# Patient Record
Sex: MALE | Race: BLACK OR AFRICAN AMERICAN | Employment: OTHER | URBAN - METROPOLITAN AREA
[De-identification: names, ages, dates, MRNs, and addresses within clinical notes are randomized per-mention and may not be internally consistent; named-entity substitution may affect disease eponyms.]

---

## 2018-10-31 ENCOUNTER — ED HISTORICAL/CONVERTED ENCOUNTER (OUTPATIENT)
Dept: OTHER | Age: 68
End: 2018-10-31

## 2019-02-23 ENCOUNTER — IP HISTORICAL/CONVERTED ENCOUNTER (OUTPATIENT)
Dept: OTHER | Age: 69
End: 2019-02-23

## 2019-09-05 ENCOUNTER — IP HISTORICAL/CONVERTED ENCOUNTER (OUTPATIENT)
Dept: OTHER | Age: 69
End: 2019-09-05

## 2019-10-17 ENCOUNTER — ED HISTORICAL/CONVERTED ENCOUNTER (OUTPATIENT)
Dept: OTHER | Age: 69
End: 2019-10-17

## 2019-10-23 ENCOUNTER — IP HISTORICAL/CONVERTED ENCOUNTER (OUTPATIENT)
Dept: OTHER | Age: 69
End: 2019-10-23

## 2019-11-08 ENCOUNTER — ED HISTORICAL/CONVERTED ENCOUNTER (OUTPATIENT)
Dept: OTHER | Age: 69
End: 2019-11-08

## 2019-11-20 ENCOUNTER — IP HISTORICAL/CONVERTED ENCOUNTER (OUTPATIENT)
Dept: OTHER | Age: 69
End: 2019-11-20

## 2020-09-23 ENCOUNTER — APPOINTMENT (OUTPATIENT)
Dept: ULTRASOUND IMAGING | Age: 70
End: 2020-09-23
Attending: HOSPITALIST
Payer: MEDICARE

## 2020-09-23 ENCOUNTER — HOSPITAL ENCOUNTER (OUTPATIENT)
Age: 70
Setting detail: OBSERVATION
Discharge: HOME | End: 2020-09-24
Attending: HOSPITALIST | Admitting: HOSPITALIST
Payer: MEDICARE

## 2020-09-23 ENCOUNTER — APPOINTMENT (OUTPATIENT)
Dept: GENERAL RADIOLOGY | Age: 70
End: 2020-09-23
Attending: EMERGENCY MEDICINE
Payer: MEDICARE

## 2020-09-23 ENCOUNTER — APPOINTMENT (OUTPATIENT)
Dept: GENERAL RADIOLOGY | Age: 70
End: 2020-09-23
Attending: RADIOLOGY
Payer: MEDICARE

## 2020-09-23 ENCOUNTER — HOSPITAL ENCOUNTER (EMERGENCY)
Age: 70
Discharge: OTHER INSTITUTION | End: 2020-09-23
Attending: EMERGENCY MEDICINE
Payer: MEDICARE

## 2020-09-23 VITALS
DIASTOLIC BLOOD PRESSURE: 66 MMHG | OXYGEN SATURATION: 99 % | HEIGHT: 68 IN | HEART RATE: 87 BPM | TEMPERATURE: 98.6 F | RESPIRATION RATE: 21 BRPM | SYSTOLIC BLOOD PRESSURE: 145 MMHG

## 2020-09-23 DIAGNOSIS — J90 PLEURAL EFFUSION: Primary | ICD-10-CM

## 2020-09-23 PROBLEM — F11.11 OPIOID ABUSE, IN REMISSION (HCC): Status: ACTIVE | Noted: 2020-09-23

## 2020-09-23 PROBLEM — K70.31 ASCITES DUE TO ALCOHOLIC CIRRHOSIS (HCC): Status: ACTIVE | Noted: 2020-01-02

## 2020-09-23 PROBLEM — I85.10 SECONDARY ESOPHAGEAL VARICES WITHOUT BLEEDING (HCC): Status: ACTIVE | Noted: 2020-09-23

## 2020-09-23 PROBLEM — N18.30 CKD (CHRONIC KIDNEY DISEASE), STAGE III (HCC): Status: ACTIVE | Noted: 2019-12-28

## 2020-09-23 LAB
ALBUMIN SERPL-MCNC: 1.5 G/DL (ref 3.5–4.7)
ALBUMIN/GLOB SERPL: 0.3 {RATIO}
ALP SERPL-CCNC: 168 U/L (ref 38–126)
ALT SERPL-CCNC: 33 U/L (ref 3–72)
ANION GAP SERPL CALC-SCNC: 6 MMOL/L
AST SERPL W P-5'-P-CCNC: 84 U/L (ref 17–74)
ATRIAL RATE: 132 BPM
BASOPHILS # BLD: 0 K/UL (ref 0–0.1)
BASOPHILS NFR BLD: 0 % (ref 0–2)
BILIRUB SERPL-MCNC: 3.5 MG/DL (ref 0.2–1)
BUN SERPL-MCNC: 40 MG/DL (ref 9–21)
BUN/CREAT SERPL: 18
CA-I BLD-MCNC: 7.4 MG/DL (ref 8.5–10.5)
CALCULATED P AXIS, ECG09: -24 DEGREES
CALCULATED R AXIS, ECG10: 12 DEGREES
CALCULATED T AXIS, ECG11: 45 DEGREES
CHLORIDE SERPL-SCNC: 105 MMOL/L (ref 94–111)
CO2 SERPL-SCNC: 25 MMOL/L (ref 21–33)
CREAT SERPL-MCNC: 2.2 MG/DL (ref 0.8–1.5)
DIAGNOSIS, 93000: NORMAL
DIFFERENTIAL METHOD BLD: ABNORMAL
EOSINOPHIL # BLD: 0 K/UL (ref 0–0.4)
EOSINOPHIL NFR BLD: 0 %
ERYTHROCYTE [DISTWIDTH] IN BLOOD BY AUTOMATED COUNT: 16.1 %
GLOBULIN SER CALC-MCNC: 4.7 G/DL
GLUCOSE SERPL-MCNC: 129 MG/DL (ref 70–110)
HCT VFR BLD AUTO: 29.3 % (ref 36–48)
HGB BLD-MCNC: 9.4 % (ref 13–16)
IMM GRANULOCYTES # BLD AUTO: 0 K/UL
IMM GRANULOCYTES NFR BLD AUTO: 0 %
INR PPP: 1.4 (ref 0.8–1.2)
LYMPHOCYTES # BLD: 0.4 K/UL
LYMPHOCYTES NFR BLD: 5 %
MCH RBC QN AUTO: 31.5 PG (ref 24–34)
MCHC RBC AUTO-ENTMCNC: 32.1 G/DL (ref 31–37)
MCV RBC AUTO: 98.3 FL (ref 74–97)
MONOCYTES # BLD: 0.3 K/UL
MONOCYTES NFR BLD: 4 %
NEUTS SEG # BLD: 7.2 K/UL
NEUTS SEG NFR BLD: 91 %
NRBC # BLD: 0.08 K/UL
NRBC BLD MANUAL-RTO: 1 PER 100 WBC
P-R INTERVAL, ECG05: 107 MS
PLATELET # BLD AUTO: 101 K/UL (ref 135–420)
PMV BLD AUTO: 12.4 FL
POTASSIUM SERPL-SCNC: 3.9 MMOL/L (ref 3.2–5.1)
PROT SERPL-MCNC: 6.2 G/DL (ref 6.1–8.4)
PROTHROMBIN TIME: 15.2 SEC (ref 11.5–15.2)
Q-T INTERVAL, ECG07: 359 MS
QRS DURATION, ECG06: 80 MS
QTC CALCULATION (BEZET), ECG08: 437 MS
RBC # BLD AUTO: 2.98 M/UL (ref 4.7–5.5)
RBC MORPH BLD: ABNORMAL
SODIUM SERPL-SCNC: 136 MMOL/L (ref 135–145)
TROPONIN I SERPL-MCNC: 0.03 NG/ML (ref 0.02–0.05)
VENTRICULAR RATE, ECG03: 89 BPM
WBC # BLD AUTO: 7.9 K/UL (ref 4.6–13.2)

## 2020-09-23 PROCEDURE — 2709999900 HC NON-CHARGEABLE SUPPLY

## 2020-09-23 PROCEDURE — 84484 ASSAY OF TROPONIN QUANT: CPT

## 2020-09-23 PROCEDURE — 71045 X-RAY EXAM CHEST 1 VIEW: CPT

## 2020-09-23 PROCEDURE — 80053 COMPREHEN METABOLIC PANEL: CPT

## 2020-09-23 PROCEDURE — 85610 PROTHROMBIN TIME: CPT

## 2020-09-23 PROCEDURE — 77030040361 HC SLV COMPR DVT MDII -B

## 2020-09-23 PROCEDURE — C1729 CATH, DRAINAGE: HCPCS

## 2020-09-23 PROCEDURE — 74011000250 HC RX REV CODE- 250: Performed by: RADIOLOGY

## 2020-09-23 PROCEDURE — 99218 HC RM OBSERVATION: CPT

## 2020-09-23 PROCEDURE — 74011250637 HC RX REV CODE- 250/637: Performed by: PHYSICIAN ASSISTANT

## 2020-09-23 PROCEDURE — 93005 ELECTROCARDIOGRAM TRACING: CPT

## 2020-09-23 PROCEDURE — 74011250637 HC RX REV CODE- 250/637: Performed by: HOSPITALIST

## 2020-09-23 PROCEDURE — 99291 CRITICAL CARE FIRST HOUR: CPT

## 2020-09-23 PROCEDURE — 77010033678 HC OXYGEN DAILY

## 2020-09-23 PROCEDURE — 85025 COMPLETE CBC W/AUTO DIFF WBC: CPT

## 2020-09-23 RX ORDER — SPIRONOLACTONE 25 MG/1
100 TABLET ORAL DAILY
Status: DISCONTINUED | OUTPATIENT
Start: 2020-09-23 | End: 2020-09-23

## 2020-09-23 RX ORDER — LACTULOSE 10 G/15ML
10 SOLUTION ORAL; RECTAL 2 TIMES DAILY
COMMUNITY
Start: 2020-07-22

## 2020-09-23 RX ORDER — SODIUM CHLORIDE 0.9 % (FLUSH) 0.9 %
5-40 SYRINGE (ML) INJECTION EVERY 8 HOURS
Status: DISCONTINUED | OUTPATIENT
Start: 2020-09-23 | End: 2020-09-24 | Stop reason: HOSPADM

## 2020-09-23 RX ORDER — SPIRONOLACTONE 25 MG/1
50 TABLET ORAL DAILY
Status: DISCONTINUED | OUTPATIENT
Start: 2020-09-24 | End: 2020-09-24 | Stop reason: HOSPADM

## 2020-09-23 RX ORDER — ACETAMINOPHEN 650 MG/1
650 SUPPOSITORY RECTAL
Status: DISCONTINUED | OUTPATIENT
Start: 2020-09-23 | End: 2020-09-23

## 2020-09-23 RX ORDER — SPIRONOLACTONE 50 MG/1
50 TABLET, FILM COATED ORAL DAILY
Status: ON HOLD | COMMUNITY
Start: 2020-06-30 | End: 2020-09-23

## 2020-09-23 RX ORDER — MIDODRINE HYDROCHLORIDE 10 MG/1
10 TABLET ORAL 3 TIMES DAILY
COMMUNITY
Start: 2020-06-13 | End: 2020-11-29

## 2020-09-23 RX ORDER — SPIRONOLACTONE 100 MG/1
100 TABLET, FILM COATED ORAL DAILY
COMMUNITY

## 2020-09-23 RX ORDER — FUROSEMIDE 20 MG/1
20 TABLET ORAL DAILY
Status: ON HOLD | COMMUNITY
Start: 2020-06-30 | End: 2020-09-23

## 2020-09-23 RX ORDER — ACETAMINOPHEN 325 MG/1
650 TABLET ORAL
Status: DISCONTINUED | OUTPATIENT
Start: 2020-09-23 | End: 2020-09-23

## 2020-09-23 RX ORDER — FUROSEMIDE 20 MG/1
20 TABLET ORAL DAILY
Status: DISCONTINUED | OUTPATIENT
Start: 2020-09-24 | End: 2020-09-24 | Stop reason: HOSPADM

## 2020-09-23 RX ORDER — SODIUM CHLORIDE 0.9 % (FLUSH) 0.9 %
5-40 SYRINGE (ML) INJECTION AS NEEDED
Status: DISCONTINUED | OUTPATIENT
Start: 2020-09-23 | End: 2020-09-24 | Stop reason: HOSPADM

## 2020-09-23 RX ORDER — FUROSEMIDE 40 MG/1
40 TABLET ORAL DAILY
COMMUNITY

## 2020-09-23 RX ORDER — TRAMADOL HYDROCHLORIDE 50 MG/1
50 TABLET ORAL
Status: DISCONTINUED | OUTPATIENT
Start: 2020-09-23 | End: 2020-09-24 | Stop reason: HOSPADM

## 2020-09-23 RX ORDER — PROMETHAZINE HYDROCHLORIDE 25 MG/1
12.5 TABLET ORAL
Status: DISCONTINUED | OUTPATIENT
Start: 2020-09-23 | End: 2020-09-24 | Stop reason: HOSPADM

## 2020-09-23 RX ORDER — ONDANSETRON 2 MG/ML
4 INJECTION INTRAMUSCULAR; INTRAVENOUS
Status: DISCONTINUED | OUTPATIENT
Start: 2020-09-23 | End: 2020-09-24 | Stop reason: HOSPADM

## 2020-09-23 RX ORDER — FUROSEMIDE 40 MG/1
40 TABLET ORAL DAILY
Status: DISCONTINUED | OUTPATIENT
Start: 2020-09-23 | End: 2020-09-23

## 2020-09-23 RX ORDER — POLYETHYLENE GLYCOL 3350 17 G/17G
17 POWDER, FOR SOLUTION ORAL DAILY PRN
Status: DISCONTINUED | OUTPATIENT
Start: 2020-09-23 | End: 2020-09-24 | Stop reason: HOSPADM

## 2020-09-23 RX ORDER — LIDOCAINE HYDROCHLORIDE 10 MG/ML
10 INJECTION INFILTRATION; PERINEURAL
Status: COMPLETED | OUTPATIENT
Start: 2020-09-23 | End: 2020-09-23

## 2020-09-23 RX ADMIN — Medication 10 ML: at 21:00

## 2020-09-23 RX ADMIN — LIDOCAINE HYDROCHLORIDE 10 ML: 10 INJECTION, SOLUTION INFILTRATION; PERINEURAL at 14:50

## 2020-09-23 RX ADMIN — Medication 10 ML: at 14:00

## 2020-09-23 RX ADMIN — FUROSEMIDE 40 MG: 40 TABLET ORAL at 12:05

## 2020-09-23 RX ADMIN — LACTULOSE 15 ML: 20 SOLUTION ORAL at 17:41

## 2020-09-23 RX ADMIN — TRAMADOL HYDROCHLORIDE 50 MG: 50 TABLET, FILM COATED ORAL at 15:58

## 2020-09-23 RX ADMIN — SPIRONOLACTONE 100 MG: 25 TABLET ORAL at 12:05

## 2020-09-23 RX ADMIN — TRAMADOL HYDROCHLORIDE 50 MG: 50 TABLET, FILM COATED ORAL at 21:30

## 2020-09-23 NOTE — PROCEDURES
VASCULAR & INTERVENTIONAL RADIOLOGY NOTE:    Procedure:  Ultrasound guided right thoracentesis    Pre-operative Diagnosis: Pleural effusion    Post-operative Diagnosis: same    Indications: Shortness of breath. Procedure Details: Informed consent obtained from patient. Standard aseptic technique. Ultrasound guidance. Posterior intercostal approach. 1% lidocaine for local anesthesia. 5 Western Lexus Yueh sheathed needle connected to vacuum bottle. 2000 mL of cloudy yellow fluid removed. Complications:  None. Condition: Stable. Impression:  Successful thoracentesis. Plan: Post procedure chest xray pending.       Tammy Odell MD  Vascular & Interventional Radiology    [facility]     9/23/2020

## 2020-09-23 NOTE — H&P
Internal Medicine History and Physical          Subjective     HPI: Marley Nowak is a 77 y.o. male with a PMHx of ascites due to alcoholic cirrhosis and CKD3 who was transferred from [place] due to need for thoracentesis. Patient states he typically gets a thoracentesis once a month, but it is not regularly scheduled. He just goes to the ER when he's feeling SOB. Right sided thoracentesis on 8/15 with 1200cc removed, again on 9/16 with 2L drained. He usually has these procedures done at [facility], but there were no beds available. O2 stable on 1L NC. CXR with complete opacification of R hemithorax - likely large effusion. IR has been contacted.     PMHx:  Past Medical History:   Diagnosis Date    Ascites due to alcoholic cirrhosis ([facility] [address] 2/5/2650    CKD (chronic kidney disease), stage III ([facility] [address] 12/28/2019    Hypertension     Liver disease     Opioid abuse, in remission ([facility] [address] 9/23/2020    Secondary esophageal varices without bleeding ([facility] [address] 9/23/2020       PSurgHx:  Past Surgical History:   Procedure Laterality Date    HX CHOLECYSTECTOMY         SocialHx:  Social History     Socioeconomic History    Marital status:      Spouse name: Not on file    Number of children: Not on file    Years of education: Not on file    Highest education level: Not on file   Occupational History    Not on file   Social Needs    Financial resource strain: Not on file    Food insecurity     Worry: Not on file     Inability: Not on file    Transportation needs     Medical: Not on file     Non-medical: Not on file   Tobacco Use    Smoking status: Current Every Day Smoker     Packs/day: 0.10     Years: 30.00     Pack years: 3.00    Smokeless tobacco: Never Used   Substance and Sexual Activity    Alcohol use: Not Currently    Drug use: Not Currently     Types: Heroin, Cocaine, Hallucinogenics    Sexual activity: Not on file   Lifestyle    Physical activity     Days per week: Not on file     Minutes per session: Not on file    Stress: Not on file   Relationships    Social connections     Talks on phone: Not on file     Gets together: Not on file     Attends Scientologist service: Not on file     Active member of club or organization: Not on file     Attends meetings of clubs or organizations: Not on file     Relationship status: Not on file    Intimate partner violence     Fear of current or ex partner: Not on file     Emotionally abused: Not on file     Physically abused: Not on file     Forced sexual activity: Not on file   Other Topics Concern    Not on file   Social History Narrative    Not on file       FamilyHx:  No family history on file. Home Medications:  Prior to Admission Medications   Prescriptions Last Dose Informant Patient Reported? Taking?   furosemide (LASIX) 20 mg tablet   Yes No   Sig: Take 20 mg by mouth daily. furosemide (Lasix) 40 mg tablet 9/22/2020 at Unknown time  Yes Yes   Sig: Take 40 mg by mouth daily. lactulose (CHRONULAC) 10 gram/15 mL solution   Yes No   Sig: Take 10 g by mouth two (2) times a day. midodrine (PROAMATINE) 10 mg tablet   Yes Yes   Sig: Take 10 mg by mouth three (3) times daily. rifAXIMin (XIFAXAN) 550 mg tablet   Yes Yes   Sig: Take 550 mg by mouth two (2) times a day. spironolactone (ALDACTONE) 100 mg tablet 9/22/2020 at Unknown time  Yes Yes   Sig: Take  by mouth daily. Unsure of dosage   spironolactone (ALDACTONE) 50 mg tablet   Yes No   Sig: Take 50 mg by mouth daily.       Facility-Administered Medications: None       Allergies:  No Known Allergies     Review of Systems:  CONST: Fever, weight loss, fatigue or chills  HEENT: Recent changes in vision, vertigo, epistaxis, dysphagia and hoarseness  CV: Chest pain, palpitations, HTN, edema and varicosities  RESP: Cough, shortness of breath, wheezing, hemoptysis, snoring and reactive airway disease  GI: Nausea, vomiting, abdominal pain, change in bowel habits, hematochezia, melena, and GERD   : Hematuria, dysuria, frequency, urgency, nocturia and stress urinary incontinence   MS: Weakness, joint pain and arthritis  ENDO: Diabetes, thyroid disease, polyuria, polydipsia, polyphagia, poor wound healing, heat intolerance, cold intolerance  LYMPH/HEME: Anemia, bruising and history of blood transfusions  INTEG: Dermatitis, abnormal moles  NEURO: Dizziness, headache, fainting, seizures and stroke  PSYCH: Anxiety and depression      Objective      Visit Vitals  /63 (BP 1 Location: Left arm, BP Patient Position: At rest)   Pulse 87   Temp 97.4 °F (36.3 °C)   Resp 20   SpO2 96%       Physical Exam:  General Appearance: NAD, conversant  HENT: normocephalic/atraumatic, moist mucus membranes  Lungs: absent breath sounds on R, CTA on L with normal respiratory effort  Cardiovascular: RRR, no m/r/g  Abdomen: soft, non-tender, normal bowel sounds  Extremities: no cyanosis, no peripheral edema  Neuro: moves all extremities, no focal deficits  Psych: appropriate affect, alert and oriented to person, place and time    Laboratory Studies: All lab results for the last 24 hours reviewed. Imaging Reviewed:  Xr Chest Port    Result Date: 9/23/2020  EXAM: XR CHEST PORT CLINICAL INDICATION/HISTORY: SOB -Additional: None COMPARISON: 9/8/2020 TECHNIQUE: Portable frontal view of the chest _______________ FINDINGS: SUPPORT DEVICES: None. HEART AND MEDIASTINUM: Cardiomediastinal silhouette within normal limits. LUNGS AND PLEURAL SPACES: Complete opacification of the right hemithorax, likely large effusion. Left lung is clear.  No pneumothorax. _______________     IMPRESSION: Complete opacification of the right hemithorax, likely large effusion      EKG:    Sinus rhythm   Atrial premature complexes   Short SC interval            Assessment/Plan     Principal Problem:    Pleural effusion (9/23/2020)    Active Problems:    Ascites due to alcoholic cirrhosis ([facility] [address] (1/2/2020)      CKD (chronic kidney disease), stage III ([facility] [address] (12/28/2019)      Recurrent pleural effusion 2/2 end stage liver disease  - to IR for therapeutic thoracentesis  - this is not a new effusion, no need for diagnostic labs  - was last tested on 9/16  - no concerns for infection    Alcoholic cirrhosis with ascites  - cont rifaximin, lactulose, spironolactone, lasix    CKD  - near baseline, monitor    - Cont acceptable home medications for chronic conditions   - DVT protocol    I have personally reviewed all pertinent labs, films and EKGs that have officially resulted. I reviewed available electronic documentation outlining the initial presentation as well as the emergency room physician's encounter.     Cristina Dennis PA-C  [facility]  Hospitalist Division  Office:  [phone]  Pager: [phone]

## 2020-09-23 NOTE — ED NOTES
Bedside shift change report given to Dung Goldstein (oncoming nurse) by Judy Harrison RN (offgoing nurse). Report included the following information SBAR, ED Summary and MAR.

## 2020-09-23 NOTE — PROGRESS NOTES
conducted an initial consultation and Spiritual Assessment for Mandi Resendez, who is a 77 y. o.,male. Patients Primary Language is: [place]. According to the patients EMR Worship Affiliation is: [facility].     The reason the Patient came to the hospital is:   Patient Active Problem List    Diagnosis Date Noted    Pleural effusion 09/23/2020        The  provided the following Interventions:  Initiated a relationship of care and support with patient in room 2404 today. .  Listened empathically as patient talked about how he got to be here from [place] after having some surgery there yesterday. Patient said that there was no room for him there so here he is. Provided information about Spiritual Care Services. Offered prayer and assurance of continued prayers on patients behalf. The following outcomes were achieved:  Patient shared limited information about his medical narrative and spiritual journey/beliefs. Patient processed feeling about current hospitalization. Patient expressed gratitude for pastoral care visit. Assessment:  Patient does not have any Quaker/cultural needs that will affect patients preferences in health care. There are no further spiritual or Quaker issues which require Spiritual Care Services interventions at this time. Plan:  Chaplains will continue to follow and will provide pastoral care on an as needed/requested basis    . Kaya Romeo   Spiritual Care   [phone]

## 2020-09-23 NOTE — ED TRIAGE NOTES
Patient called EMS complaining of shortness of breath. Per the patient his last paracentesis was on Thursday and usually he has to get drained about once a month. Patient reports he started feeling short of breath three days ago, but was trying to wait until tomorrow to see his doctor. Patient 91% on room air, was placed on 1L NC en route.

## 2020-09-23 NOTE — ED PROVIDER NOTES
EMERGENCY DEPARTMENT HISTORY AND PHYSICAL EXAM      Date: 9/23/2020  Patient Name: Micky Luna    History of Presenting Illness     Chief Complaint   Patient presents with    Shortness of Breath       History Provided By: Patient and EMS    HPI: Micky Luna, 77 y.o. male with a past medical history significant hypertension and Cirrhosis of the liver and requires periodic thoracentesis and paracentesis, gout presents to the ED via EMS with cc of shortness of breath. Patient believes that he has too much fluid in his stomach as lungs over the last 3 days due to progressively worsening shortness of breath. His last paracentesis was a week ago at [place]. He was due to see his PCP tomorrow but he could not wait because of severity of shortness of breath. He denies any fever, cough, chest pain or abdomen pain. He states he has been tested for COVID 3 times and is been negative. He also states the usually get fluid drained once a month at [facility]. There are no other complaints, changes, or physical findings at this time. PCP: Unknown, Provider    Current Outpatient Medications   Medication Sig Dispense Refill    spironolactone (ALDACTONE) 100 mg tablet Take  by mouth daily. Unsure of dosage      furosemide (Lasix) 40 mg tablet Take 40 mg by mouth daily. Past History     Past Medical History:  Past Medical History:   Diagnosis Date    Hypertension     Liver disease        Past Surgical History:  Past Surgical History:   Procedure Laterality Date    HX CHOLECYSTECTOMY         Family History:  History reviewed. No pertinent family history.     Social History:  Social History     Tobacco Use    Smoking status: Current Every Day Smoker     Packs/day: 0.10     Years: 30.00     Pack years: 3.00    Smokeless tobacco: Never Used   Substance Use Topics    Alcohol use: Not Currently    Drug use: Not Currently     Types: Heroin, Cocaine, Hallucinogenics       Allergies:  No Known Allergies      Review of Systems     Review of Systems   Constitutional: Negative for chills and fever. Somewhat anxious elderly male in no acute distress. He is on 2 L nasal cannula and pulse ox is 98%. HENT: Negative for congestion and sore throat. Respiratory: Positive for shortness of breath. Negative for cough. Cardiovascular: Negative for chest pain and palpitations. Gastrointestinal: Negative for abdominal pain, nausea and vomiting. Abdominal swelling   Genitourinary: Negative for dysuria, flank pain and frequency. Musculoskeletal: Negative for arthralgias, joint swelling and myalgias. Skin: Negative for color change and wound. Neurological: Negative for dizziness, weakness, light-headedness and headaches. Hematological: Negative for adenopathy. Physical Exam     Physical Exam  Vitals signs and nursing note reviewed. Constitutional:       General: He is not in acute distress. Appearance: He is well-developed. He is not diaphoretic. Comments: Elderly male, anxious but not acutely in distress. Pulse ox is 98% on 2 L nasal cannula. HENT:      Head: Normocephalic and atraumatic. No right periorbital erythema or left periorbital erythema. Jaw: No trismus. Right Ear: External ear normal. No drainage or swelling. Tympanic membrane is not perforated, erythematous or bulging. Left Ear: External ear normal. No drainage or swelling. Tympanic membrane is not perforated, erythematous or bulging. Nose: Nose normal. No mucosal edema or rhinorrhea. Right Sinus: No maxillary sinus tenderness or frontal sinus tenderness. Left Sinus: No maxillary sinus tenderness or frontal sinus tenderness. Mouth/Throat:      Mouth: No oral lesions. Dentition: No dental abscesses. Pharynx: Uvula midline. No oropharyngeal exudate, posterior oropharyngeal erythema or uvula swelling. Tonsils: No tonsillar abscesses. Eyes:      General: No scleral icterus. Right eye: No discharge. Left eye: No discharge. Conjunctiva/sclera: Conjunctivae normal.   Neck:      Musculoskeletal: Normal range of motion and neck supple. Cardiovascular:      Rate and Rhythm: Normal rate and regular rhythm. Heart sounds: Normal heart sounds. No murmur. No friction rub. No gallop. Pulmonary:      Effort: Pulmonary effort is normal. No tachypnea, accessory muscle usage or respiratory distress. Breath sounds: Examination of the right-middle field reveals decreased breath sounds. Examination of the right-lower field reveals decreased breath sounds. Decreased breath sounds present. No wheezing, rhonchi or rales. Abdominal:      General: Bowel sounds are normal. There is no distension. Palpations: Abdomen is soft. Tenderness: There is no abdominal tenderness. Comments: Abdomen is slightly distended but is soft and nontender. Musculoskeletal: Normal range of motion. General: No tenderness. Lymphadenopathy:      Cervical: No cervical adenopathy. Skin:     General: Skin is warm and dry. Neurological:      Mental Status: He is alert and oriented to person, place, and time.    Psychiatric:         Judgment: Judgment normal.         Diagnostic Study Results     Labs -     Recent Results (from the past 12 hour(s))   EKG, 12 LEAD, INITIAL    Collection Time: 09/23/20  2:16 AM   Result Value Ref Range    Ventricular Rate 89 BPM    Atrial Rate 132 BPM    P-R Interval 107 ms    QRS Duration 80 ms    Q-T Interval 359 ms    QTC Calculation (Bezet) 437 ms    Calculated P Axis -24 degrees    Calculated R Axis 12 degrees    Calculated T Axis 45 degrees    Diagnosis       Sinus rhythm  Atrial premature complexes  Short AR interval  Low voltage, extremity leads     METABOLIC PANEL, COMPREHENSIVE    Collection Time: 09/23/20  2:33 AM   Result Value Ref Range    Sodium 136 135 - 145 mmol/L    Potassium 3.9 3.2 - 5.1 mmol/L    Chloride 105 94 - 111 mmol/L CO2 25 21 - 33 mmol/L    Anion gap 6 mmol/L    Glucose 129 (H) 70 - 110 mg/dL    BUN 40 (H) 9 - 21 mg/dL    Creatinine 2.20 (H) 0.8 - 1.50 mg/dL    BUN/Creatinine ratio 18      GFR est AA 36 ml/min/1.73m2    GFR est non-AA 30 ml/min/1.73m2    Calcium 7.4 (L) 8.5 - 10.5 mg/dL    Bilirubin, total 3.5 (H) 0.2 - 1.0 mg/dL    AST (SGOT) 84 (H) 17 - 74 U/L    ALT (SGPT) 33 3 - 72 U/L    Alk. phosphatase 168 (H) 38 - 126 U/L    Protein, total 6.2 6.1 - 8.4 g/dL    Albumin 1.5 (L) 3.5 - 4.7 g/dL    Globulin 4.7 g/dL    A-G Ratio 0.3     CBC WITH AUTOMATED DIFF    Collection Time: 09/23/20  2:33 AM   Result Value Ref Range    WBC 7.9 4.6 - 13.2 K/uL    RBC 2.98 (L) 4.70 - 5.50 M/uL    HGB 9.4 (L) 13.0 - 16.0 %    HCT 29.3 (L) 36.0 - 48.0 %    MCV 98.3 (H) 74.0 - 97.0 FL    MCH 31.5 24.0 - 34.0 PG    MCHC 32.1 31.0 - 37.0 g/dL    RDW 16.1 %    PLATELET 135 (L) 255 - 420 K/uL    MPV 12.4 FL    NEUTROPHILS 91 %    LYMPHOCYTES 5 %    MONOCYTES 4 %    EOSINOPHILS 0 %    BASOPHILS 0 0 - 2 %    IMMATURE GRANULOCYTES 0 %    ABS. NEUTROPHILS 7.2 K/UL    ABS. LYMPHOCYTES 0.4 K/UL    ABS. MONOCYTES 0.3 K/UL    ABS. EOSINOPHILS 0.0 0.0 - 0.4 K/UL    ABS. BASOPHILS 0.0 0.0 - 0.1 K/UL    ABS. IMM. GRANS. 0.0 K/UL    DF Manual      NRBC 1.0  WBC    ABSOLUTE NRBC 0.08 K/uL    RBC COMMENTS Normocytic, Normochromic     PROTHROMBIN TIME + INR    Collection Time: 09/23/20  2:33 AM   Result Value Ref Range    Prothrombin time 15.2 11.5 - 15.2 sec    INR 1.4 (H) 0.8 - 1.2     TROPONIN I    Collection Time: 09/23/20  2:33 AM   Result Value Ref Range    Troponin-I, Qt. 0.03 0.02 - 0.05 ng/mL       Radiologic Studies -   [unfilled]  CT Results  (Last 48 hours)    None        CXR Results  (Last 48 hours)    None          Medical Decision Making and ED Course   I am the first provider for this patient.     I reviewed the vital signs, available nursing notes, past medical history, past surgical history, family history and social history. Vital Signs-Reviewed the patient's vital signs. Patient Vitals for the past 12 hrs:   Temp Pulse Resp BP SpO2   09/23/20 0504  87 21 (!) 145/66 99 %   09/23/20 0146     98 %   09/23/20 0138 98.6 °F (37 °C) 99 30 118/65 98 %       EKG interpretation: (Preliminary)  Rhythm: normal sinus rhythm; and regular . Rate (approx.): 89; Axis: normal; MO interval: normal; QRS interval: normal ; ST/T wave: normal; Other findings: normal.    Records Reviewed: Nursing Notes and Old Medical Records    The patient presents with shortness of breath with a differential diagnosis of pleural effusion, ascites, pneumothorax, pneumonia, CHF, PE, MI, dissection. Provider Notes (Medical Decision Making):   Patient with large recurrent right pleural effusion. He is not in acute respiratory distress and pulse ox is 98% on 1 L of nasal cannula. His labs also stable. No IR to do thoracentesis at this facility. Attempted transfer to [facility] where he usually gets t thoracentesis or paracentesis but there is no bed available. Also attempted [place] and again no bed available. Finally discussed with  at [facility] who accepted patient if IR was available and was to have transfer center consult with IR. Transfer center then called and advised that he would accept, IR is available at [place]. [facility]       ED Course:   Initial assessment performed. The patients presenting problems have been discussed, and they are in agreement with the care plan formulated and outlined with them. I have encouraged them to ask questions as they arise throughout their visit. Patient resting comfortably most of ED visit.     Procedures               CRITICAL CARE NOTE :  3:08 AM  Amount of Critical Care Time: __45__(minutes)__    IMPENDING DETERIORATION -Respiratory and Cardiovascular  ASSOCIATED RISK FACTORS - Hypotension, Shock, Hypoxia and Metabolic changes  MANAGEMENT- Bedside Assessment and oxygen management  INTERPRETATION -  Xrays, ECG and Blood Pressure  INTERVENTIONS - hemodynamic mngmt and respiratory  CASE REVIEW - Hospitalist  TREATMENT RESPONSE -Improved  PERFORMED BY - Physician    NOTES   :  I have spent critical care time involved in lab review, consultations with specialist, family decision- making, bedside attention and documentation. This time excludes time spent in any separate billed procedures. During this entire length of time I was immediately available to the patient . Madison Norris MD        Disposition         Disposition: transferred           Clinical Impression:   1. Pleural effusion        Attestations:    Madison Norris MD    Please note that this dictation was completed with [employer], the computer voice recognition software. Quite often unanticipated grammatical, syntax, homophones, and other interpretive errors are inadvertently transcribed by the computer software. Please disregard these errors. Please excuse any errors that have escaped final proofreading. Thank you.

## 2020-09-23 NOTE — PROGRESS NOTES
Patient admitted direct from [facility]. Alert x4 ,oxygen at 1 liters  Dr. Mame TEJEDA in to see patient  [phone] to ultrasound via stretcher  [id number] returned from [facility], right side dressing cdi, no sob  1600 tramadol given for pain  Dr. Meghann Gee in to see patient  Bedside shift report given to Carrie García with sbar

## 2020-09-24 ENCOUNTER — APPOINTMENT (OUTPATIENT)
Dept: NON INVASIVE DIAGNOSTICS | Age: 70
End: 2020-09-24
Attending: INTERNAL MEDICINE
Payer: MEDICARE

## 2020-09-24 VITALS
SYSTOLIC BLOOD PRESSURE: 100 MMHG | WEIGHT: 169 LBS | HEART RATE: 77 BPM | HEIGHT: 68 IN | RESPIRATION RATE: 18 BRPM | BODY MASS INDEX: 25.61 KG/M2 | TEMPERATURE: 97.6 F | DIASTOLIC BLOOD PRESSURE: 53 MMHG | OXYGEN SATURATION: 97 %

## 2020-09-24 PROBLEM — I50.32 CHRONIC DIASTOLIC (CONGESTIVE) HEART FAILURE (HCC): Status: ACTIVE | Noted: 2020-09-24

## 2020-09-24 LAB
ECHO AO ASC DIAM: 3.05 CM
ECHO AO ROOT DIAM: 2.9 CM
ECHO IVC PROX: 1.3 CM
ECHO IVC SNIFF: 1.3 CM
ECHO LA AREA 2C: 25 CM2
ECHO LA AREA 4C: 22.5 CM2
ECHO LA MAJOR AXIS: 3.74 CM
ECHO LA MINOR AXIS: 1.97 CM
ECHO LA TO AORTIC ROOT RATIO: 1.29
ECHO LA VOL 2C: 83.29 ML (ref 18–58)
ECHO LA VOL 4C: 74.93 ML (ref 18–58)
ECHO LA VOL BP: 88.31 ML (ref 18–58)
ECHO LA VOL/BSA BIPLANE: 46.41 ML/M2 (ref 16–28)
ECHO LA VOLUME INDEX A2C: 43.77 ML/M2 (ref 16–28)
ECHO LA VOLUME INDEX A4C: 39.38 ML/M2 (ref 16–28)
ECHO LV E' LATERAL VELOCITY: 11.96 CM/S
ECHO LV E' SEPTAL VELOCITY: 7.47 CM/S
ECHO LV EDV A2C: 127.8 ML
ECHO LV EDV A4C: 99.3 ML
ECHO LV EDV BP: 113.1 ML (ref 67–155)
ECHO LV EDV INDEX A4C: 52.2 ML/M2
ECHO LV EDV INDEX BP: 59.4 ML/M2
ECHO LV EDV NDEX A2C: 67.2 ML/M2
ECHO LV EDV TEICHHOLZ: 0.63 ML
ECHO LV EJECTION FRACTION A2C: 62 %
ECHO LV EJECTION FRACTION A4C: 61 %
ECHO LV EJECTION FRACTION BIPLANE: 60.7 % (ref 55–100)
ECHO LV ESV A2C: 48.3 ML
ECHO LV ESV A4C: 38.8 ML
ECHO LV ESV BP: 44.4 ML (ref 22–58)
ECHO LV ESV INDEX A2C: 25.4 ML/M2
ECHO LV ESV INDEX A4C: 20.4 ML/M2
ECHO LV ESV INDEX BP: 23.3 ML/M2
ECHO LV ESV TEICHHOLZ: 0.32 ML
ECHO LV INTERNAL DIMENSION DIASTOLIC: 4.82 CM (ref 4.2–5.9)
ECHO LV INTERNAL DIMENSION SYSTOLIC: 3.62 CM
ECHO LV IVSD: 0.81 CM (ref 0.6–1)
ECHO LV MASS 2D: 142.3 G (ref 88–224)
ECHO LV MASS INDEX 2D: 74.8 G/M2 (ref 49–115)
ECHO LV POSTERIOR WALL DIASTOLIC: 0.93 CM (ref 0.6–1)
ECHO LVOT DIAM: 2 CM
ECHO LVOT PEAK GRADIENT: 4.95 MMHG
ECHO LVOT SV: 76.7 ML
ECHO LVOT VTI: 24.34 CM
ECHO MV A VELOCITY: 80.88 CM/S
ECHO MV E DECELERATION TIME (DT): 206.9 MS
ECHO MV E VELOCITY: 114.22 CM/S
ECHO MV E/A RATIO: 1.41
ECHO MV E/E' LATERAL: 9.55
ECHO MV E/E' RATIO (AVERAGED): 12.42
ECHO MV E/E' SEPTAL: 15.29
ECHO RA MINOR AXIS: 3.93 CM
ECHO RV TAPSE: 2.16 CM (ref 1.5–2)
ECHO TV REGURGITANT MAX VELOCITY: 254 CM/S
ECHO TV REGURGITANT PEAK GRADIENT: 25.71 MMHG
LVFS 2D: 24.88 %
LVOT MG: 2.46 MMHG
LVOT MV: 0.75 CM/S
LVSV (MOD BI): 35.82 ML
LVSV (MOD SINGLE 4C): 31.58 ML
LVSV (MOD SINGLE): 41.48 ML
LVSV (TEICH): 27.85 ML
MV DEC SLOPE: 5.52

## 2020-09-24 PROCEDURE — 99222 1ST HOSP IP/OBS MODERATE 55: CPT | Performed by: INTERNAL MEDICINE

## 2020-09-24 PROCEDURE — 93306 TTE W/DOPPLER COMPLETE: CPT

## 2020-09-24 PROCEDURE — 99218 HC RM OBSERVATION: CPT

## 2020-09-24 PROCEDURE — 2709999900 HC NON-CHARGEABLE SUPPLY

## 2020-09-24 PROCEDURE — 74011250637 HC RX REV CODE- 250/637: Performed by: PHYSICIAN ASSISTANT

## 2020-09-24 RX ADMIN — FUROSEMIDE 20 MG: 20 TABLET ORAL at 08:52

## 2020-09-24 RX ADMIN — SPIRONOLACTONE 50 MG: 25 TABLET ORAL at 08:52

## 2020-09-24 RX ADMIN — LACTULOSE 15 ML: 20 SOLUTION ORAL at 08:51

## 2020-09-24 RX ADMIN — Medication 10 ML: at 06:15

## 2020-09-24 NOTE — DISCHARGE SUMMARY
2 [facility]  Hospitalist Division    Discharge Summary    Patient: Pat Fuller MRN: [id number]  [facility]: [id number]    YOB: 1950  Age: 77 y.o. Sex: male    DOA: 9/23/2020 LOS:  LOS: 1 day   Discharge Date:      Admission Diagnoses: Pleural effusion [J90]    Discharge Diagnoses:  Principal Problem:    Pleural effusion (9/23/2020)    Active Problems:    Ascites due to alcoholic cirrhosis ([facility] [address] (1/2/2020)      CKD (chronic kidney disease), stage III ([facility] [address] (12/28/2019)      Chronic diastolic (congestive) heart failure ([facility] [address] (9/24/2020)        Discharge Condition: Stable    Discharge To: Home    Consults: Pulmonary/Critical Care    HPI: Pat Fuller is a 77 y.o. male with a PMHx of ascites due to alcoholic cirrhosis and CKD3 who was transferred from [place] due to need for thoracentesis. Patient states he typically gets a thoracentesis once a month, but it is not regularly scheduled. He just goes to the ER when he's feeling SOB. Right sided thoracentesis on 8/15 with 1200cc removed, again on 9/16 with 2L drained. He usually has these procedures done at [facility], but there were no beds available. O2 stable on 1L NC. CXR with complete opacification of R hemithorax - likely large effusion. IR has been contacted. Hospital Course:   Recurrent pleural effusion (hepatic hydrothorax)  - therapeutic thoracentesis - 2000mL drained  - pleural fluids was last tested on 9/16 - transudative. No fluids studies done this time. - no s/sx infection  - pulmonology consulted - appreciate - continue PRN thoracentesis until TIPS settles. Recs echo (results below). No real role of pleur-x/chest tube.   - s/p TIPS, has hepatologist at [facility]     Alcoholic cirrhosis with ascites  - cont rifaximin, lactulose, spironolactone, lasix     CKD  - at baseline, monitor      Physical Exam:  General appearance: alert, cooperative, no distress, appears stated age  Head: Normocephalic, without obvious abnormality, atraumatic  Lungs: clear to auscultation bilaterally  Heart: regular rate and rhythm, S1, S2 normal, no murmur, click, rub or gallop  Abdomen: soft, non-tender. Bowel sounds normal. No masses,  no organomegaly  Extremities: no cyanosis or edema  Skin: Skin color, texture normal. No rashes or lesions  Neurologic: no focal deficits, motor/sensory intact  PSY: Mood and affect normal, appropriately behaved    Significant Diagnostic Studies: All lab results for the last 24 hours reviewed. Echo 9/24/20  Interpretation Summary     Result status: Final result    · LV: Estimated LVEF is 55 - 60%. Visually measured ejection fraction. Normal cavity size, wall thickness and systolic function (ejection fraction normal). Wall motion: normal. Moderate (grade 2) left ventricular diastolic dysfunction. · LA: Moderately dilated left atrium. Left Atrium volume index is 46.48 mL/m2. · AV: Moderate aortic valve sclerosis with no evidence of reduced excursion. Aortic valve leaflet calcification present. · MV: Mitral valve non-specific thickening. · PA: Pulmonary arterial systolic pressure is 29 mmHg. Comparison Study Information     Prior Study     There is no prior study available for comparison    Echo Findings     Left Ventricle  Normal cavity size, wall thickness and systolic function (ejection fraction normal). Prominent trabeculations. Wall motion: normal. The estimated EF is 55 - 60%. Visually measured ejection fraction. There is moderate (grade 2) left ventricular diastolic dysfunction. Wall Scoring  The left ventricular wall motion is normal.             Left Atrium  Moderately dilated left atrium. Left Atrium volume index is 46.48 mL/m2. Right Ventricle  Normal cavity size and global systolic function. Right Atrium  Normal cavity size. Aortic Valve  No stenosis. Moderate aortic valve sclerosis with no evidence of reduced excursion. There is left leaflet calcification.  Trace aortic valve regurgitation. Mitral Valve  No stenosis. Mitral valve non-specific thickening. Trace regurgitation. Tricuspid Valve  Normal valve structure and no stenosis. Trace regurgitation. Pulmonic Valve  Normal valve structure and no stenosis. Trace regurgitation. Aorta  Normal aortic root and ascending aorta. Pulmonary Artery  Pulmonary arterial systolic pressure (PASP) is 29 mmHg. Pulmonary hypertension not suggested by Doppler findings. IVC/Hepatic Veins  Normal central venous pressure (0-5 mmHg); IVC diameter is less than 21 mm and collapses more than 50% with respiration. Pericardium  Ascites present. Xr Chest Sngl V    Result Date: 9/23/2020  EXAM:  AP CHEST INDICATION:  Status post right thoracentesis. TECHNIQUE:  AP erect view. COMPARISON:  None. _______________________ FINDINGS: No evidence for pneumothorax status post right thoracentesis. Moderate to large left pleural effusion with adjacent left basilar consolidation likely compressive atelectasis. Degenerative change around the visualized shoulders. Visualized thoracic silhouette is midline. _______________________     IMPRESSION: No evidence for pneumothorax status post right thoracentesis. No significant residual right pleural effusion. Moderate to large left pleural effusion. Xr Chest Port    Result Date: 9/23/2020  EXAM: XR CHEST PORT CLINICAL INDICATION/HISTORY: SOB -Additional: None COMPARISON: 9/8/2020 TECHNIQUE: Portable frontal view of the chest _______________ FINDINGS: SUPPORT DEVICES: None. HEART AND MEDIASTINUM: Cardiomediastinal silhouette within normal limits. LUNGS AND PLEURAL SPACES: Complete opacification of the right hemithorax, likely large effusion. Left lung is clear.  No pneumothorax. _______________     IMPRESSION: Complete opacification of the right hemithorax, likely large effusion    Us Thoracentesis Rt Ndl W Image    Result Date: 9/23/2020  PROCEDURE:  ULTRASOUND GUIDED THERAPEUTIC RIGHT THORACENTESIS: INDICATION: Right pleural effusion. Shortness of breath. ___________________________________________ TECHNIQUE/FINDINGS: I performed the procedure. The right pleural effusion was localized under ultrasound guidance. The skin was marked, prepped and draped in sterile fashion and lidocaine was used for local anesthesia. A 5 Western Lexus Yueh needle was advanced into the fluid. A total of 2000 mL of cloudy yellow fluid was drained using a Vacutainer system. The patient tolerated the procedure well and there were no immediate complications. Pre-procedure time out:  1450 hours. GUIDANCE: Ultrasound guidance was used to position (and confirm the position of) the Yueh sheathed needle. Image(s) saved in PACS: Ultrasound ___________________________________________     IMPRESSION: Successful ultrasound guided therapeutic right thoracentesis of approximately 2000 mL of cloudy yellow fluid. Procedures: Stated above    Discharge Medications:     Current Discharge Medication List      CONTINUE these medications which have NOT CHANGED    Details   spironolactone (ALDACTONE) 100 mg tablet Take 100 mg by mouth daily. Unsure of dosage   - Per [facility] records 9/16/20, patient is supposed to be taking 100 mg. He has not picked up. Only has picked up 50 mg in 6/2020 (30 day supply)      furosemide (Lasix) 40 mg tablet Take 40 mg by mouth daily. - Per [facility] records 9/16/20, patient is supposed to be taking 40 mg. He has not picked up. Only has picked up 20 mg in 6/2020 (30 day supply)      lactulose (CHRONULAC) 10 gram/15 mL solution Take 10 g by mouth two (2) times a day. midodrine (PROAMATINE) 10 mg tablet Take 10 mg by mouth three (3) times daily.              Activity: Activity as tolerated    Diet: Cardiac Diet    Wound Care: None needed    Follow-up: 1 week with PCP, nephrology (patient reported appt on 9/28), hepatologist    Discharge time: >35 minutes    Rangel Millan PA-C  [address] [place] [address] [address] [facility]  Office:  [phone]  Pager: [phone]      9/24/2020, 1:09 PM

## 2020-09-24 NOTE — CONSULTS
Esperanza Ram M.D  Pulmonary Critical Care & Sleep Medicine       Pulmonary  Initial Consultation    Name: Vijaya Pablo     : 1950     Date: 2020        IMPRESSION:   Patient Active Problem List   Diagnosis Code    Pleural effusion J90    Ascites due to alcoholic cirrhosis ([facility] [address] V37.05    CKD (chronic kidney disease), stage III ([facility] [address] N18.3    Secondary esophageal varices without bleeding (HCC) I85.10    Opioid abuse, in remission ([facility] [address] F11.11 ·   Hepatic Hydrothorax: S/P TIPS follows with Hepatology at [facility]. Recurrent pleura effusion and ascitis. WIll benefit from theraputic thoracentasis. Consider diagnostic thoracentasis if develops fever or other signs of sepsis. Otherwise manage with on and off thoracentasis till TIPS settles. Patient does have effusion on left side which could be part of hepatic hydrothorax but might have underline cardiomyopathy due to ETOH and contributing to bilateral effusion. Consider Echo. Consider Lasix to keep in negative balance. No real  Role of PLEUREX/Chest tube as it will further affect electrolytes & fluid blaance. Ideally TIPS is the treatment of choice till bridged to transplant and he already underwent the same and needs to have close follow up with hepatologist.   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   -- Monitor Cr K and Serial xrays  -- Will follow  -- Thank you for consult         Old records reviewed discussed results and management plan with patient  Images personally reviewed and results and labs reviewed and discussed with patient. All medication reviewed and adjusted  Plan of care discussed with patient. Subjective:Alcoholic Cirrhosis/SOB/Pleural Effusion: Stable. SOB improved after recent thoracentesis. Hx of recurrent pleural effusion and ascites requiring multiple taps. Following with Dr. Nikos Cordoba at [facility]; last seen 7/22/20. Stressed the importance of continued abstinence from alcohol. Patient who was transferred from [place] [facility] due to need for thoracentesis. Patient states he typically gets a thoracentesis once a month, but it is not regularly scheduled. He just goes to the ER when he's feeling SOB. Right sided thoracentesis on 8/15 with 1200cc removed, again on 9/16 with 2L drained. He usually has these procedures done at [facility], but there were no beds available. O2 stable on 1L NC. CXR with complete opacification of R hemithorax - likely large effusion. S/P thoracentasis and about 2 liter removed on RA now. Also has effusion on left side. Prior labs from [facility] reviewed all consistent with transudative effusion. No fever or other complain. S/p Thoracentasis effusion improved on Right.  Patient wish to go home and follow up with his [facility] liver specialist.         Past Medical History:   Diagnosis Date    Ascites due to alcoholic cirrhosis ([address] 6/5/0041    CKD (chronic kidney disease), stage III ([address] 12/28/2019    Hypertension     Liver disease     Opioid abuse, in remission ([address] 9/23/2020    Secondary esophageal varices without bleeding ([address] 9/23/2020       Past Surgical History:   Procedure Laterality Date    HX CHOLECYSTECTOMY         Social History     Socioeconomic History    Marital status:      Spouse name: Not on file    Number of children: Not on file    Years of education: Not on file    Highest education level: Not on file   Tobacco Use    Smoking status: Current Every Day Smoker     Packs/day: 0.10     Years: 30.00     Pack years: 3.00    Smokeless tobacco: Never Used   Substance and Sexual Activity    Alcohol use: Not Currently    Drug use: Not Currently     Types: Heroin, Cocaine, Hallucinogenics       No family history on file. No Known Allergies    .   Current Facility-Administered Medications   Medication Dose Route Frequency Provider Last Rate Last Dose    sodium chloride (NS) flush 5-40 mL  5-40 mL IntraVENous Q8H Kavitha Howard MD   10 mL at 09/24/20 0615    sodium chloride (NS) flush 5-40 mL  5-40 mL IntraVENous PRN Kavitha Howard MD        polyethylene glycol (MIRALAX) packet 17 g  17 g Oral DAILY PRN Kavitha Howard MD        promethazine (PHENERGAN) tablet 12.5 mg  12.5 mg Oral Q6H PRN Kavitha Howard MD        Or    ondansetron [facility]) injection 4 mg  4 mg IntraVENous Q6H PRN Kavitha Howard MD        influenza vaccine 2020-21 (6 mos+)(PF) (FLUARIX/FLULAVAL/FLUZONE QUAD) injection 0.5 mL  0.5 mL IntraMUSCular PRIOR TO DISCHARGE Kavitha Howard MD        furosemide (LASIX) tablet 20 mg  20 mg Oral DAILY RhondaglLina hawkins PA   20 mg at 09/24/20 0852    lactulose (CHRONULAC) 10 gram/15 mL solution 15 mL  10 g Oral BID ReprogleBerika Chu, PA   15 mL at 09/24/20 7159    spironolactone (ALDACTONE) tablet 50 mg  50 mg Oral DAILY ReprogleLina PA   50 mg at 09/24/20 5758    traMADoL (ULTRAM) tablet 50 mg  50 mg Oral Q6H PRN RhondaglLina hawkins PA   50 mg at 09/23/20 2130         Review of Systems:  HEENT: No epistaxis, no nasal drainage, no difficulty in swallowing, no redness in eyes  Respiratory: as above  Cardiovascular: no chest pain, no palpitations, no chronic leg edema, no syncope  Gastrointestinal: no abd pain, no vomiting, no diarrhea, no bleeding symptoms  Genitourinary: No urinary symptoms or hematuria  Integument/breast: No ulcers or rashes  Musculoskeletal:Neg  Neurological: No focal weakness, no seizures, no headaches  Behvioral/Psych: No anxiety, no depression  Constitutional: No fever, no chills, no weight loss, no night sweats     Objective:     Visit Vitals  BP (!) 100/53 (BP 1 Location: Left arm, BP Patient Position: At rest)   Pulse 79   Temp 98.1 °F (36.7 °C)   Resp 18   SpO2 94%        Physical Exam:   General: comfortable, no acute distress  HEENT: pupils reactive, sclera anicteric, EOM intact  Neck: No adenopathy or thyroid swelling, no lymphadenopathy or JVD, supple  CVS: S1S2 no murmurs  RS: Mod AE bilaterally, decrease at right base and left base some rales  Abd: soft, non tender, no hepatosplenomegaly  Neuro: non focal, awake, alert  Extrm: no leg edema, clubbing or cyanosis  Skin: no rash    Data review:       Chemistry Recent Labs     09/23/20 0233   *      K 3.9      CO2 25   BUN 40*   CREA 2.20*   CA 7.4*   AGAP 6   BUCR 18   *   TP 6.2   ALB 1.5*   GLOB 4.7   AGRAT 0.3        Lactic Acid No results found for: LAC  No results for input(s): LAC in the last 72 hours. Liver Enzymes Protein, total   Date Value Ref Range Status   09/23/2020 6.2 6.1 - 8.4 g/dL Final     Albumin   Date Value Ref Range Status   09/23/2020 1.5 (L) 3.5 - 4.7 g/dL Final     Globulin   Date Value Ref Range Status   09/23/2020 4.7 g/dL Final     A-G Ratio   Date Value Ref Range Status   09/23/2020 0.3   Final     Alk. phosphatase   Date Value Ref Range Status   09/23/2020 168 (H) 38 - 126 U/L Final     Recent Labs     09/23/20 0233   TP 6.2   ALB 1.5*   GLOB 4.7   AGRAT 0.3   *        CBC w/Diff Recent Labs     09/23/20 0233   WBC 7.9   RBC 2.98*   HGB 9.4*   HCT 29.3*   *   GRANS 91   LYMPH 5   EOS 0        Coagulation Recent Labs     09/23/20 0233   PTP 15.2   INR 1.4*         Thyroid  No results found for: T4, T3U, TSH, TSHEXT         ABG No results for input(s): PHI, PHI, POC2, PCO2I, PO2, PO2I, HCO3, HCO3I, FIO2, FIO2I in the last 72 hours.      Urinalysis No results found for: COLOR, APPRN, SPGRU, REFSG, ÁNGEL, PROTU, GLUCU, KETU, BILU, UROU, NURY, LEUKU, GLUKE, EPSU, BACTU, WBCU, RBCU, CASTS, UCRY     Micro  No results for input(s): SDES, CULT in the last 72 hours. No results for input(s): CULT in the last 72 hours. XR (Most Recent). CXR reviewed by me and compared with previous CXR Results from Hospital Encounter encounter on 09/23/20   XR CHEST SNGL V    Narrative EXAM:  AP CHEST    INDICATION:  Status post right thoracentesis. TECHNIQUE:  AP erect view. COMPARISON:  None.    _______________________    FINDINGS: No evidence for pneumothorax status post right thoracentesis. Moderate  to large left pleural effusion with adjacent left basilar consolidation likely  compressive atelectasis. Degenerative change around the visualized shoulders. Visualized thoracic silhouette is midline.    _______________________      Impression IMPRESSION:    No evidence for pneumothorax status post right thoracentesis. No significant  residual right pleural effusion. Moderate to large left pleural effusion. CT (Most Recent) No results found for this or any previous visit. EKG No results found for this or any previous visit. ECHO No results found for this or any previous visit. PFT No flowsheet data found.           Jagruti Bedolla MD  Pulmonary Critical Care & Sleep Medicine

## 2020-09-24 NOTE — DISCHARGE INSTRUCTIONS
DISCHARGE SUMMARY from Nurse    PATIENT INSTRUCTIONS:    After general anesthesia or intravenous sedation, for 24 hours or while taking prescription Narcotics:  · Limit your activities  · Do not drive and operate hazardous machinery  · Do not make important personal or business decisions  · Do  not drink alcoholic beverages  · If you have not urinated within 8 hours after discharge, please contact your surgeon on call. Report the following to your surgeon:  · Excessive pain, swelling, redness or odor of or around the surgical area  · Temperature over 100.5  · Nausea and vomiting lasting longer than 4 hours or if unable to take medications  · Any signs of decreased circulation or nerve impairment to extremity: change in color, persistent  numbness, tingling, coldness or increase pain  · Any questions    What to do at Home:  Recommended activity: Activity as tolerated. If you experience any of the following symptoms listed on your discharge summary, please follow up with PCP. *  Please give a list of your current medications to your Primary Care Provider. *  Please update this list whenever your medications are discontinued, doses are      changed, or new medications (including over-the-counter products) are added. *  Please carry medication information at all times in case of emergency situations. These are general instructions for a healthy lifestyle:    No smoking/ No tobacco products/ Avoid exposure to second hand smoke  Surgeon General's Warning:  Quitting smoking now greatly reduces serious risk to your health.     Obesity, smoking, and sedentary lifestyle greatly increases your risk for illness    A healthy diet, regular physical exercise & weight monitoring are important for maintaining a healthy lifestyle    You may be retaining fluid if you have a history of heart failure or if you experience any of the following symptoms:  Weight gain of 3 pounds or more overnight or 5 pounds in a week, increased swelling in our hands or feet or shortness of breath while lying flat in bed. Please call your doctor as soon as you notice any of these symptoms; do not wait until your next office visit. Patient armband removed and shredded    The discharge information has been reviewed with the patient. The patient verbalized understanding. Discharge medications reviewed with the patient and appropriate educational materials and side effects teaching were provided.   ___________________________________________________________________________________________________________________________________

## 2020-09-24 NOTE — PROGRESS NOTES
Problem: Pain  Goal: *Control of Pain  Outcome: Progressing Towards Goal     Problem: Patient Education: Go to Patient Education Activity  Goal: Patient/Family Education  Outcome: Progressing Towards Goal     Problem: Falls - Risk of  Goal: *Absence of Falls  Description: Document Wyatt Holt Fall Risk and appropriate interventions in the flowsheet.   Outcome: Progressing Towards Goal  Note: Fall Risk Interventions:  Mobility Interventions: Bed/chair exit alarm, Patient to call before getting OOB         Medication Interventions: Patient to call before getting OOB, Teach patient to arise slowly         History of Falls Interventions: Bed/chair exit alarm         Problem: Patient Education: Go to Patient Education Activity  Goal: Patient/Family Education  Outcome: Progressing Towards Goal

## 2020-09-24 NOTE — PROGRESS NOTES
Problem: Discharge Planning  Goal: *Discharge to safe environment  Outcome: Resolved/Met   Plan home    Reason for Admission:   Pleural effusion                   RUR Score:       na              Plan for utilizing home health:   no       PCP: First and Last name:  Dr Jaylin Luna in [address] [place] [address]   Name of Practice:    Are you a current patient: Yes/No: yes   Approximate date of last visit: has appoint in oct   Can you participate in a virtual visit with your PCP:  no                    Current Advanced Directive/Advance Care Plan: no                         Transition of Care Plan:    Spoke with pt,lives with his sister. His sister helps him with adls. He amb with a walker. No other dme. Demographics correct. Will need cab ride home    Plan home      Patient has designated _____sister___________________ to participate in his/her discharge plan and to receive any needed information. Name: Kerry Delgadillo  Address:  Phone number: [phone]    . Patient and/or next of kin has been given and has signed the [facility] Outpatient Observation  Notification letter and all questions answered. Copy of this notice given to patient and copy placed on chart. Patient and/or next of kin has been given the Outpatient Observation Information and Notification letter and all questions answered. Care Management Interventions  PCP Verified by CM: Yes  Palliative Care Criteria Met (RRAT>21 & CHF Dx)?: No  Mode of Transport at Discharge: Other (see comment)  Transition of Care Consult (CM Consult):  Other  Discharge Durable Medical Equipment: No  Physical Therapy Consult: No  Occupational Therapy Consult: No  Speech Therapy Consult: No  Current Support Network: Relative's Home  Confirm Follow Up Transport: Cab  The Patient and/or Patient Representative was Provided with a Choice of Provider and Agrees with the Discharge Plan?: No  Freedom of Choice List was Provided with Basic Dialogue that Supports the Patient's Individualized Plan of Care/Goals, Treatment Preferences and Shares the Quality Data Associated with the Providers?: No  Discharge Location  Discharge Placement: Home

## 2020-09-24 NOTE — PROGRESS NOTES
Received pt lying in bed alert and oriented x 4. Using urinal without difficulty. Dressing to lower right back dry and intact. No complaints at this time. No s/s of distress or discomfort noted. Will continue to monitor. 2130  Pt c/o pain to bilateral feet and Left knee states it is due to his gout. Medicated at this time with Ultram as ordered. 0725 Bedside and Verbal shift change report given to [facility] (oncoming nurse) by Sera Mack (offgoing nurse). Report included the following information SBAR, Kardex and Intake/Output.

## 2020-09-24 NOTE — PROGRESS NOTES
Problem: Discharge Planning  Goal: *Discharge to safe environment  Outcome: Resolved/Met   Plan home    Pt dc'chreelle chino, yeimy pts nurse called cab for pt. Care Management Interventions  PCP Verified by CM: Yes  Palliative Care Criteria Met (RRAT>21 & CHF Dx)?: No  Mode of Transport at Discharge: Other (see comment)  Transition of Care Consult (CM Consult):  Other  Discharge Durable Medical Equipment: No  Physical Therapy Consult: No  Occupational Therapy Consult: No  Speech Therapy Consult: No  Current Support Network: Relative's Home  Confirm Follow Up Transport: Cab  The Patient and/or Patient Representative was Provided with a Choice of Provider and Agrees with the Discharge Plan?: No  Freedom of Choice List was Provided with Basic Dialogue that Supports the Patient's Individualized Plan of Care/Goals, Treatment Preferences and Shares the Quality Data Associated with the Providers?: No  Discharge Location  Discharge Placement: Home

## 2020-09-24 NOTE — PROGRESS NOTES
Bedside and Verbal shift change report given to [address] (oncoming nurse) by Shane Linton RN (offgoing nurse). Report included the following information SBAR, Kardex, Intake/Output, MAR and Recent Results. 1148-Patient awake. Reassessment completed, no change in patient's condition. Call bell within reach. 1450-Pt has discharge order, this nurse in room to administer flu vaccine, pt states that,\" he doesn't want it. \" vaccine return back to bin. 1505-Discharge summary review with pt, pt had no questions or concerns. 1525-Patient discharge; no distress noted, [facility] via wheelchair to front entrance to meet taxi. Larry De Luna

## 2020-09-26 ENCOUNTER — HOSPITAL ENCOUNTER (EMERGENCY)
Age: 70
Discharge: ANOTHER HOSPITAL | End: 2020-09-27
Attending: INTERNAL MEDICINE
Payer: MEDICARE

## 2020-09-26 ENCOUNTER — APPOINTMENT (OUTPATIENT)
Dept: GENERAL RADIOLOGY | Age: 70
End: 2020-09-26
Attending: INTERNAL MEDICINE
Payer: MEDICARE

## 2020-09-26 DIAGNOSIS — J90 CHRONIC BILATERAL PLEURAL EFFUSIONS: Primary | ICD-10-CM

## 2020-09-26 DIAGNOSIS — K70.11 ASCITES DUE TO ALCOHOLIC HEPATITIS: ICD-10-CM

## 2020-09-26 LAB
ALBUMIN SERPL-MCNC: 1.5 G/DL (ref 3.5–4.7)
ALBUMIN/GLOB SERPL: 0.3 {RATIO}
ALP SERPL-CCNC: 167 U/L (ref 38–126)
ALT SERPL-CCNC: 26 U/L (ref 3–72)
ANION GAP SERPL CALC-SCNC: 6 MMOL/L
AST SERPL W P-5'-P-CCNC: 77 U/L (ref 17–74)
BASOPHILS # BLD: 0 K/UL (ref 0–0.1)
BASOPHILS NFR BLD: 0 % (ref 0–2)
BILIRUB DIRECT SERPL-MCNC: 1.9 MG/DL (ref 0–0.3)
BILIRUB SERPL-MCNC: 3.7 MG/DL (ref 0.2–1)
BUN SERPL-MCNC: 36 MG/DL (ref 9–21)
BUN/CREAT SERPL: 18
CA-I BLD-MCNC: 7.6 MG/DL (ref 8.5–10.5)
CHLORIDE SERPL-SCNC: 103 MMOL/L (ref 94–111)
CO2 SERPL-SCNC: 23 MMOL/L (ref 21–33)
CREAT SERPL-MCNC: 2 MG/DL (ref 0.8–1.5)
EOSINOPHIL # BLD: 0.1 K/UL (ref 0–0.4)
EOSINOPHIL NFR BLD: 1 % (ref 0–5)
ERYTHROCYTE [DISTWIDTH] IN BLOOD BY AUTOMATED COUNT: 16 %
GLOBULIN SER CALC-MCNC: 4.8 G/DL
GLUCOSE SERPL-MCNC: 101 MG/DL (ref 70–110)
HCT VFR BLD AUTO: 27.3 % (ref 36–48)
HGB BLD-MCNC: 8.7 % (ref 13–16)
IMM GRANULOCYTES # BLD AUTO: 0 K/UL
IMM GRANULOCYTES NFR BLD AUTO: 0 %
LACTATE SERPL-SCNC: 1.7 MMOL/L (ref 0.5–2)
LYMPHOCYTES # BLD: 0.9 K/UL (ref 0.9–3.6)
LYMPHOCYTES NFR BLD: 15 % (ref 21–52)
MAGNESIUM SERPL-MCNC: 1.8 MG/DL (ref 1.7–2.8)
MCH RBC QN AUTO: 30.7 PG (ref 24–34)
MCHC RBC AUTO-ENTMCNC: 31.9 G/DL (ref 31–37)
MCV RBC AUTO: 96.5 FL (ref 74–97)
MONOCYTES # BLD: 0.9 K/UL (ref 0.05–1.2)
MONOCYTES NFR BLD: 15 % (ref 3–10)
NEUTS SEG # BLD: 4.4 K/UL (ref 1.8–8)
NEUTS SEG NFR BLD: 69 % (ref 40–73)
PLATELET # BLD AUTO: 118 K/UL (ref 135–420)
PMV BLD AUTO: 11.6 FL
POTASSIUM SERPL-SCNC: 4.5 MMOL/L (ref 3.2–5.1)
PROT SERPL-MCNC: 6.3 G/DL (ref 6.1–8.4)
RBC # BLD AUTO: 2.83 M/UL (ref 4.7–5.5)
SODIUM SERPL-SCNC: 132 MMOL/L (ref 135–145)
TROPONIN I SERPL-MCNC: 0.03 NG/ML (ref 0.02–0.05)
WBC # BLD AUTO: 6.3 K/UL (ref 4.6–13.2)

## 2020-09-26 PROCEDURE — 87804 INFLUENZA ASSAY W/OPTIC: CPT

## 2020-09-26 PROCEDURE — 99285 EMERGENCY DEPT VISIT HI MDM: CPT

## 2020-09-26 PROCEDURE — 80076 HEPATIC FUNCTION PANEL: CPT

## 2020-09-26 PROCEDURE — 74011250637 HC RX REV CODE- 250/637: Performed by: INTERNAL MEDICINE

## 2020-09-26 PROCEDURE — 85025 COMPLETE CBC W/AUTO DIFF WBC: CPT

## 2020-09-26 PROCEDURE — 83605 ASSAY OF LACTIC ACID: CPT

## 2020-09-26 PROCEDURE — 71046 X-RAY EXAM CHEST 2 VIEWS: CPT

## 2020-09-26 PROCEDURE — 80048 BASIC METABOLIC PNL TOTAL CA: CPT

## 2020-09-26 PROCEDURE — 84484 ASSAY OF TROPONIN QUANT: CPT

## 2020-09-26 PROCEDURE — 83735 ASSAY OF MAGNESIUM: CPT

## 2020-09-26 RX ORDER — ACETAMINOPHEN 500 MG
1000 TABLET ORAL
Status: COMPLETED | OUTPATIENT
Start: 2020-09-26 | End: 2020-09-26

## 2020-09-26 RX ADMIN — ACETAMINOPHEN 1000 MG: 500 TABLET ORAL at 23:51

## 2020-09-27 VITALS
BODY MASS INDEX: 25.61 KG/M2 | RESPIRATION RATE: 17 BRPM | SYSTOLIC BLOOD PRESSURE: 101 MMHG | OXYGEN SATURATION: 97 % | HEART RATE: 75 BPM | TEMPERATURE: 98.8 F | DIASTOLIC BLOOD PRESSURE: 56 MMHG | HEIGHT: 68 IN | WEIGHT: 169 LBS

## 2020-09-27 LAB
APPEARANCE UR: CLEAR
BACTERIA URNS QL MICRO: NEGATIVE /HPF
BILIRUB UR QL: NEGATIVE
COLOR UR: ABNORMAL
COVID-19 RAPID TEST, COVR: NOT DETECTED
EPITH CASTS URNS QL MICRO: ABNORMAL /LPF (ref 0–20)
FLUAV AG NPH QL IA: NEGATIVE
FLUBV AG NOSE QL IA: NEGATIVE
GLUCOSE UR STRIP.AUTO-MCNC: NEGATIVE MG/DL
HGB UR QL STRIP: ABNORMAL
HYALINE CASTS URNS QL MICRO: ABNORMAL /LPF
KETONES UR QL STRIP.AUTO: NEGATIVE MG/DL
LEUKOCYTE ESTERASE UR QL STRIP.AUTO: NEGATIVE
NITRITE UR QL STRIP.AUTO: NEGATIVE
PH UR STRIP: 6 [PH] (ref 5–9)
PROT UR STRIP-MCNC: NEGATIVE MG/DL
RBC #/AREA URNS HPF: ABNORMAL /HPF (ref 0–2)
SARS-COV-2, COV2: NORMAL
SP GR UR REFRACTOMETRY: 1.01 (ref 1–1.03)
SPECIMEN SOURCE: NEGATIVE
UROBILINOGEN UR QL STRIP.AUTO: 4 EU/DL (ref 0.2–1)
WBC URNS QL MICRO: ABNORMAL /HPF (ref 0–4)

## 2020-09-27 PROCEDURE — 81001 URINALYSIS AUTO W/SCOPE: CPT

## 2020-09-27 PROCEDURE — 87635 SARS-COV-2 COVID-19 AMP PRB: CPT

## 2020-09-27 NOTE — ED PROVIDER NOTES
EMERGENCY DEPARTMENT HISTORY AND PHYSICAL EXAM      Date: 9/26/2020  Patient Name: aPt Brumfield    History of Presenting Illness     Chief Complaint   Patient presents with    Generalized Body Aches    Fatigue       History Provided By: Patient    HPI: Pat Brumfield, 72 y.o. 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 States that he has SOB and slight cough due to fluid buildup in the lungs. He denies fever; chills; abdominal pain. There are no other complaints, changes, or physical findings at this time. PCP: Norris Carpenter MD    Current Outpatient Medications   Medication Sig Dispense Refill    spironolactone (ALDACTONE) 100 mg tablet Take 100 mg by mouth daily. Unsure of dosage   - [facility], patient is supposed to be taking 100 mg. He has not picked up. Only has picked up 50 mg in 6/2020 (30 day supply)      furosemide (Lasix) 40 mg tablet Take 40 mg by mouth daily. - Per [facility] records 9/16/20, patient is supposed to be taking 40 mg. He has not picked up. Only has picked up 20 mg in 6/2020 (30 day supply)      lactulose (CHRONULAC) 10 gram/15 mL solution Take 10 g by mouth two (2) times a day.  midodrine (PROAMATINE) 10 mg tablet Take 10 mg by mouth three (3) times daily.          Past History     Past Medical History:  Past Medical History:   Diagnosis Date    Ascites due to alcoholic cirrhosis ([address] 2/3/9634    Chronic diastolic (congestive) heart failure ([address] 9/24/2020    CKD (chronic kidney disease), stage III ([address] 12/28/2019    Hypertension     Liver disease     Opioid abuse, in remission ([address] 9/23/2020    Secondary esophageal varices without bleeding ([address] 9/23/2020       Past Surgical History:  Past Surgical History:   Procedure Laterality Date    HX CHOLECYSTECTOMY         Family History:  No family history on file. Social History:  Social History     Tobacco Use    Smoking status: Current Every Day Smoker     Packs/day: 0.10     Years: 30.00     Pack years: 3.00    Smokeless tobacco: Never Used   Substance Use Topics    Alcohol use: Not Currently    Drug use: Not Currently     Types: Heroin, Cocaine, Hallucinogenics       Allergies:  No Known Allergies        Review of Systems   Constitutional: Positive for appetite change and fatigue. Negative for chills, diaphoresis and fever. HENT: Negative for sore throat and trouble swallowing. Eyes: Negative for visual disturbance. Respiratory: Positive for cough and shortness of breath. Negative for chest tightness and wheezing. Cardiovascular: Positive for leg swelling. Negative for chest pain and palpitations. Gastrointestinal: Positive for abdominal distention. Negative for abdominal pain, blood in stool, nausea and vomiting. Endocrine: Negative for polyuria. Genitourinary: Negative for difficulty urinating, frequency and urgency. Musculoskeletal: Positive for arthralgias and myalgias. Skin: Negative for rash. Neurological: Positive for weakness. Negative for dizziness, numbness and headaches. Psychiatric/Behavioral: Negative for agitation. Physical Exam     Physical Exam  Vitals signs and nursing note reviewed. HENT:      Head: Normocephalic and atraumatic. Mouth/Throat:      Mouth: Mucous membranes are moist.   Eyes:      Extraocular Movements: Extraocular movements intact. Pupils: Pupils are equal, round, and reactive to light. Neck:      Musculoskeletal: Normal range of motion. Cardiovascular:      Rate and Rhythm: Normal rate and regular rhythm. Heart sounds: Murmur present. Pulmonary:      Effort: Pulmonary effort is normal. No respiratory distress.       Comments: Mild SOB; no accessory muscle use  Abdominal:      General: There is distension. Palpations: Abdomen is soft. Tenderness: There is no abdominal tenderness. There is no guarding. Genitourinary:     Comments: Scrotal edema  Musculoskeletal:         General: Swelling present. Right lower leg: Edema present. Left lower leg: Edema present. Comments: Bilateral LE edema   Skin:     General: Skin is warm and dry. Neurological:      General: No focal deficit present. Mental Status: He is alert and oriented to person, place, and time. Diagnostic Study Results     Labs -     No results found for this or any previous visit (from the past 12 hour(s)). Radiologic Studies -     CT Results  (Last 48 hours)    None        CXR Results  (Last 48 hours)               09/26/20 2222  XR CHEST PA LAT Final result    Impression:  IMPRESSION:       1. Large right pleural effusion has mildly decreased volume compared with chest   radiograph 9/23/2020.   2.  No new consolidation or effusion in the left lung. Narrative:  EXAM: CHEST, TWO VIEWS       CLINICAL INDICATION/HISTORY: fever     > Additional: History of liver disease, hypertension, ascites, esophageal   varices, heart failure       COMPARISON: Chest radiograph 9/23/2020 and 9/8/2020. CT: 8/10/2020       TECHNIQUE: PA and lateral views of the chest were obtained.       _______________       FINDINGS:       SUPPORT DEVICES: EKG leads overlie the patient. HEART AND MEDIASTINUM: Mildly enlarged cardiac silhouette, obscured on the right   by large right pleural effusion. Normal caliber thoracic aorta. No central   vascular congestion. Atherosclerotic calcifications present. LUNGS AND PLEURAL SPACES: Large volume right pleural effusion produces near   complete opacification of the right hemithorax. Hazy opacities in the aerated   right lung likely reflect passive atelectasis though superimposed   aspiration/infection cannot be excluded. Mild biapical and bibasilar scarring   are seen throughout the aerated left lung. No large consolidation or effusion in   the left lung. BONY THORAX AND SOFT TISSUES: No acute osseous abnormality. _______________                 Medical Decision Making and ED Course   I am the first provider for this patient. I reviewed the vital signs, available nursing notes, past medical history, past surgical history, family history and social history. Vital Signs-Reviewed the patient's vital signs. No data found. Records Reviewed: Old Medical Records    The patient presents with body aches: influenza; viral illness; electrolytes; infection. Provider Notes (Medical Decision Making):   Large right sided pleural effusion and SOB from recurrent hydrothorax. Will need thoracentesis. 12:11 AM  Case discussed with [facility]; ED Christian Arias; who states that they can arrange thoracentesis as an OP from the ER. Since it's going to be done tomorrow; wants pt to stay here and be transferred tomorrow morning before 8pm.   Discussed with NP Larry who states that pt should stay in ER since the stay is so short for an admission/transfer. ED Course:   Initial assessment performed. The patients presenting problems have been discussed, and they are in agreement with the care plan formulated and outlined with them. I have encouraged them to ask questions as they arise throughout their visit. Procedures       MD Chandan Bush MD        Disposition     DISCHARGE PLAN:  1. Current Discharge Medication List      CONTINUE these medications which have NOT CHANGED    Details   spironolactone (ALDACTONE) 100 mg tablet Take 100 mg by mouth daily. Unsure of dosage   - Per [facility] records 9/16/20, patient is supposed to be taking 100 mg. He has not picked up.  Only has picked up 50 mg in 6/2020 (30 day supply)      furosemide (Lasix) 40 mg tablet Take 40 mg by mouth daily. - Per [facility] records 9/16/20, patient is supposed to be taking 40 mg. He has not picked up. Only has picked up 20 mg in 6/2020 (30 day supply)      lactulose (CHRONULAC) 10 gram/15 mL solution Take 10 g by mouth two (2) times a day. midodrine (PROAMATINE) 10 mg tablet Take 10 mg by mouth three (3) times daily. 2.   Follow-up Information    None       3. Return to ED if worse     Diagnosis     Clinical Impression:   1. Chronic bilateral pleural effusions    2. Ascites due to alcoholic hepatitis        Attestations:    Colton Phillips MD    Please note that this dictation was completed with [employer], the computer voice recognition software. Quite often unanticipated grammatical, syntax, homophones, and other interpretive errors are inadvertently transcribed by the computer software. Please disregard these errors. Please excuse any errors that have escaped final proofreading. Thank you.

## 2020-09-27 NOTE — ED NOTES
TRANSFER - OUT REPORT:    Verbal report given to Ana Oreilly RN on Skylar Cai  being transferred to [facility] Emergency Department  for planned procedure (thoracentesis)     Report consisted of patients Situation, Background, Assessment and   Recommendations(SBAR). Information from the following report(s) SBAR, ED Summary, [facility] and Recent Results was reviewed with the receiving nurse. Lines:  20 gauge PIV in right wrist. No infusions running at this time. Opportunity for questions and clarification was provided. Patient will be transported by [employer]. Currently awaiting arrival of transportation company.

## 2020-10-05 PROBLEM — E03.9 HYPOTHYROIDISM: Status: ACTIVE | Noted: 2020-10-05

## 2020-10-05 PROBLEM — J06.9 URI (UPPER RESPIRATORY INFECTION): Status: ACTIVE | Noted: 2020-10-05

## 2020-10-05 RX ORDER — METRONIDAZOLE 500 MG/1
TABLET ORAL 3 TIMES DAILY
COMMUNITY

## 2020-10-05 RX ORDER — DOXYCYCLINE 100 MG/1
100 CAPSULE ORAL 2 TIMES DAILY
COMMUNITY

## 2020-10-05 RX ORDER — ONDANSETRON 4 MG/1
4 TABLET, ORALLY DISINTEGRATING ORAL
COMMUNITY

## 2024-02-15 NOTE — PROGRESS NOTES
Problem: Pain  Goal: *Control of Pain  Outcome: Progressing Towards Goal     Problem: Falls - Risk of  Goal: *Absence of Falls  Description: Document Joel Fall Risk and appropriate interventions in the flowsheet.   Outcome: Progressing Towards Goal  Note: Fall Risk Interventions:  Mobility Interventions: Bed/chair exit alarm, Patient to call before getting OOB         Medication Interventions: Bed/chair exit alarm, Patient to call before getting OOB         History of Falls Interventions: Bed/chair exit alarm         Problem: Patient Education: Go to Patient Education Activity  Goal: Patient/Family Education  Outcome: Progressing Towards Goal     Problem: Patient Education: Go to Patient Education Activity  Goal: Patient/Family Education  Outcome: Progressing Towards Goal done